# Patient Record
Sex: MALE | URBAN - METROPOLITAN AREA
[De-identification: names, ages, dates, MRNs, and addresses within clinical notes are randomized per-mention and may not be internally consistent; named-entity substitution may affect disease eponyms.]

---

## 2020-09-08 ENCOUNTER — OFFICE VISIT (OUTPATIENT)
Dept: URBAN - METROPOLITAN AREA CLINIC 25 | Facility: CLINIC | Age: 50
End: 2020-09-08
Payer: COMMERCIAL

## 2020-09-08 DIAGNOSIS — H52.13 MYOPIA, BILATERAL: Primary | ICD-10-CM

## 2020-09-08 PROCEDURE — 92014 COMPRE OPH EXAM EST PT 1/>: CPT | Performed by: OPTOMETRIST

## 2020-09-08 PROCEDURE — 92310 CONTACT LENS FITTING OU: CPT | Performed by: OPTOMETRIST

## 2020-09-08 ASSESSMENT — KERATOMETRY
OS: 43.81
OD: 44.12

## 2020-09-08 ASSESSMENT — VISUAL ACUITY
OD: 20/20
OS: 20/20

## 2020-09-08 ASSESSMENT — INTRAOCULAR PRESSURE
OD: 16
OS: 15

## 2023-06-20 ENCOUNTER — OFFICE VISIT (OUTPATIENT)
Dept: URBAN - METROPOLITAN AREA CLINIC 23 | Facility: CLINIC | Age: 53
End: 2023-06-20
Payer: COMMERCIAL

## 2023-06-20 DIAGNOSIS — H53.8 OTHER VISUAL DISTURBANCES: Primary | ICD-10-CM

## 2023-06-20 DIAGNOSIS — H52.13 MYOPIA, BILATERAL: ICD-10-CM

## 2023-06-20 PROCEDURE — 92015 DETERMINE REFRACTIVE STATE: CPT | Performed by: OPTOMETRIST

## 2023-06-20 PROCEDURE — 99213 OFFICE O/P EST LOW 20 MIN: CPT | Performed by: OPTOMETRIST

## 2023-06-20 ASSESSMENT — INTRAOCULAR PRESSURE
OS: 16
OD: 17

## 2023-06-20 ASSESSMENT — KERATOMETRY
OS: 43.63
OD: 44.13

## 2023-06-20 NOTE — IMPRESSION/PLAN
Impression: Other visual disturbances: H53.8. Plan: Pt edu on all findings. No ocular pathology noted today. Continue to monitor. Instructed patient to call with any sudden vision changes. New srx and CL were given today. ****Discussed LASIK.  Patient is to call and schedule appointment if he is interested****